# Patient Record
Sex: MALE | Race: WHITE | NOT HISPANIC OR LATINO
[De-identification: names, ages, dates, MRNs, and addresses within clinical notes are randomized per-mention and may not be internally consistent; named-entity substitution may affect disease eponyms.]

---

## 2017-06-20 ENCOUNTER — APPOINTMENT (OUTPATIENT)
Dept: UROLOGY | Facility: CLINIC | Age: 72
End: 2017-06-20

## 2017-06-20 DIAGNOSIS — R97.20 ELEVATED PROSTATE, SPECIFIC ANTIGEN [PSA]: ICD-10-CM

## 2018-07-24 ENCOUNTER — APPOINTMENT (OUTPATIENT)
Dept: UROLOGY | Facility: CLINIC | Age: 73
End: 2018-07-24
Payer: MEDICARE

## 2018-07-24 PROCEDURE — 99213 OFFICE O/P EST LOW 20 MIN: CPT | Mod: 25

## 2018-07-24 PROCEDURE — 51798 US URINE CAPACITY MEASURE: CPT

## 2018-07-25 LAB
BILIRUB UR QL STRIP: NORMAL
GLUCOSE UR-MCNC: NORMAL
HCG UR QL: 0.2 EU/DL
HGB UR QL STRIP.AUTO: NORMAL
KETONES UR-MCNC: NORMAL
LEUKOCYTE ESTERASE UR QL STRIP: NORMAL
NITRITE UR QL STRIP: NORMAL
PH UR STRIP: 6.5
PROT UR STRIP-MCNC: NORMAL
SP GR UR STRIP: 1.1

## 2019-03-22 ENCOUNTER — RX RENEWAL (OUTPATIENT)
Age: 74
End: 2019-03-22

## 2019-08-20 ENCOUNTER — APPOINTMENT (OUTPATIENT)
Dept: UROLOGY | Facility: CLINIC | Age: 74
End: 2019-08-20
Payer: MEDICARE

## 2019-08-20 VITALS
SYSTOLIC BLOOD PRESSURE: 131 MMHG | HEART RATE: 80 BPM | RESPIRATION RATE: 16 BRPM | HEIGHT: 69 IN | WEIGHT: 205 LBS | DIASTOLIC BLOOD PRESSURE: 78 MMHG | BODY MASS INDEX: 30.36 KG/M2

## 2019-08-20 PROCEDURE — 99213 OFFICE O/P EST LOW 20 MIN: CPT | Mod: 25

## 2019-08-20 PROCEDURE — 51798 US URINE CAPACITY MEASURE: CPT

## 2019-08-20 NOTE — PHYSICAL EXAM
[General Appearance - Well Developed] : well developed [General Appearance - Well Nourished] : well nourished [Abdomen Soft] : soft [Abdomen Tenderness] : non-tender [Abdomen Mass (___ Cm)] : no abdominal mass palpated [Urinary Bladder Findings] : the bladder was normal on palpation [Penis Abnormality] : normal circumcised penis [Rectal Exam - Rectum] : digital rectal exam was normal [No Prostate Nodules] : no prostate nodules [Prostate Size ___ gm] : prostate size [unfilled] gm [Edema] : no peripheral edema [Exaggerated Use Of Accessory Muscles For Inspiration] : no accessory muscle use [] : no respiratory distress [Oriented To Time, Place, And Person] : oriented to person, place, and time [Normal Station and Gait] : the gait and station were normal for the patient's age [No Focal Deficits] : no focal deficits

## 2019-08-20 NOTE — HISTORY OF PRESENT ILLNESS
[FreeTextEntry1] : Patient has history of LUTs secondary to BPH managed with Flomax. He had been on Proscar at one time but stopped due to side effects. He had been in retention when first seen 2014.  He is now on double dose Flomax, noting less nocturia (3-4). he has needed CIC for retention ~1x a month. A one time issues and no obvious risk factor that spurs it to occur. no dysuria or hematuria. \par  He also has an elevated PSA which is being followed. LAst PSA 8/15 just over 9: no Bx and wants to stop checking. \par \par PVR 32

## 2019-08-22 LAB — BACTERIA UR CULT: NORMAL

## 2019-10-25 ENCOUNTER — RX RENEWAL (OUTPATIENT)
Age: 74
End: 2019-10-25

## 2020-08-25 ENCOUNTER — TRANSCRIPTION ENCOUNTER (OUTPATIENT)
Age: 75
End: 2020-08-25

## 2020-08-25 ENCOUNTER — APPOINTMENT (OUTPATIENT)
Dept: UROLOGY | Facility: CLINIC | Age: 75
End: 2020-08-25
Payer: MEDICARE

## 2020-08-25 VITALS — TEMPERATURE: 98.2 F

## 2020-08-25 DIAGNOSIS — N52.01 ERECTILE DYSFUNCTION DUE TO ARTERIAL INSUFFICIENCY: ICD-10-CM

## 2020-08-25 PROCEDURE — 99213 OFFICE O/P EST LOW 20 MIN: CPT

## 2020-08-25 NOTE — PHYSICAL EXAM
[General Appearance - Well Developed] : well developed [General Appearance - Well Nourished] : well nourished [Abdomen Soft] : soft [Abdomen Hernia] : no hernia was discovered [Abdomen Mass (___ Cm)] : no abdominal mass palpated [Anus Abnormality] : the anus and perineum were normal [Rectal Exam - Rectum] : digital rectal exam was normal [No Prostate Nodules] : no prostate nodules [Prostate Size ___ gm] : prostate size [unfilled] gm [Edema] : no peripheral edema [Exaggerated Use Of Accessory Muscles For Inspiration] : no accessory muscle use [] : no respiratory distress [Normal Station and Gait] : the gait and station were normal for the patient's age [No Focal Deficits] : no focal deficits [Oriented To Time, Place, And Person] : oriented to person, place, and time [Inguinal Lymph Nodes Enlarged Bilaterally] : inguinal

## 2020-08-27 PROBLEM — N52.01 ERECTILE DYSFUNCTION DUE TO ARTERIAL INSUFFICIENCY: Status: ACTIVE | Noted: 2019-11-19

## 2020-08-27 NOTE — HISTORY OF PRESENT ILLNESS
[FreeTextEntry1] : Patient has history of LUTs secondary to BPH managed with Flomax. He had been on Proscar at one time but stopped due to side effects. He had been in retention when first seen 2014.  He is now on double dose Flomax, noting less nocturia (3-4). he has needed CIC for retention ~1x a month. A one time issues and no obvious risk factor that spurs it to occur. no dysuria or hematuria.\par In past year has CICed ~15 times; has episodes where needs to cath twice in a week then nothing for 2 months. always at night. no obvious reason. no UTI symptoms. \par sildenafil works. \par He also has an elevated PSA which is being followed. LAst PSA 8/15 just over 9: no Bx and wants to stop checking. \par \par

## 2020-08-30 LAB — BACTERIA UR CULT: NORMAL

## 2021-04-19 ENCOUNTER — RX RENEWAL (OUTPATIENT)
Age: 76
End: 2021-04-19

## 2021-07-23 ENCOUNTER — APPOINTMENT (OUTPATIENT)
Dept: UROLOGY | Facility: CLINIC | Age: 76
End: 2021-07-23
Payer: MEDICARE

## 2021-07-23 VITALS — HEART RATE: 81 BPM | SYSTOLIC BLOOD PRESSURE: 151 MMHG | DIASTOLIC BLOOD PRESSURE: 82 MMHG

## 2021-07-23 PROCEDURE — 51798 US URINE CAPACITY MEASURE: CPT

## 2021-07-23 PROCEDURE — 99213 OFFICE O/P EST LOW 20 MIN: CPT

## 2021-07-23 RX ORDER — SILDENAFIL 100 MG/1
100 TABLET, FILM COATED ORAL
Qty: 10 | Refills: 5 | Status: ACTIVE | COMMUNITY
Start: 2019-11-19 | End: 1900-01-01

## 2021-07-28 LAB — BACTERIA UR CULT: NORMAL

## 2021-07-31 NOTE — HISTORY OF PRESENT ILLNESS
[FreeTextEntry1] : Patient has history of LUTs secondary to BPH managed with Flomax. He had been on Proscar at one time but stopped due to side effects. He had been in retention when first seen 2014.  He is now on double dose Flomax, noting less nocturia (3-4). he has needed CIC for retention ~1x a month. A one time issues and no obvious risk factor that spurs it to occur. no dysuria or hematuria.\par In past year has CICed ~15 times; has episodes where needs to cath twice in a week then nothing for 2 months. always at night. no obvious reason. no UTI symptoms. \par sildenafil works. \par He also has an elevated PSA which is being followed. LAst PSA 8/15 just over 9: no Bx and wants to stop checking. \par \par 7/21 - stable - has to CIC twice a month, usually at night. No UTIs symptoms or hematuria. \par still uses sildenafil\par PVR 30

## 2021-07-31 NOTE — PHYSICAL EXAM
[General Appearance - Well Developed] : well developed [General Appearance - Well Nourished] : well nourished [Abdomen Soft] : soft [Abdomen Tenderness] : non-tender [Abdomen Hernia] : no hernia was discovered [Penis Abnormality] : normal circumcised penis [Urinary Bladder Findings] : the bladder was normal on palpation [Scrotum] : the scrotum was normal [Rectal Exam - Rectum] : digital rectal exam was normal [No Prostate Nodules] : no prostate nodules [Prostate Size ___ gm] : prostate size [unfilled] gm [Edema] : no peripheral edema [] : no respiratory distress [Exaggerated Use Of Accessory Muscles For Inspiration] : no accessory muscle use [Oriented To Time, Place, And Person] : oriented to person, place, and time [Normal Station and Gait] : the gait and station were normal for the patient's age [No Focal Deficits] : no focal deficits

## 2021-08-02 ENCOUNTER — RX RENEWAL (OUTPATIENT)
Age: 76
End: 2021-08-02

## 2022-07-21 ENCOUNTER — RX RENEWAL (OUTPATIENT)
Age: 77
End: 2022-07-21

## 2022-09-06 ENCOUNTER — APPOINTMENT (OUTPATIENT)
Dept: UROLOGY | Facility: CLINIC | Age: 77
End: 2022-09-06

## 2022-09-07 ENCOUNTER — APPOINTMENT (OUTPATIENT)
Dept: UROLOGY | Facility: CLINIC | Age: 77
End: 2022-09-07

## 2023-04-22 ENCOUNTER — RX RENEWAL (OUTPATIENT)
Age: 78
End: 2023-04-22

## 2023-06-09 ENCOUNTER — APPOINTMENT (OUTPATIENT)
Dept: UROLOGY | Facility: CLINIC | Age: 78
End: 2023-06-09
Payer: MEDICARE

## 2023-06-09 PROCEDURE — 51798 US URINE CAPACITY MEASURE: CPT

## 2023-06-09 PROCEDURE — 99212 OFFICE O/P EST SF 10 MIN: CPT

## 2023-06-09 NOTE — HISTORY OF PRESENT ILLNESS
[FreeTextEntry1] : Patient has history of LUTs secondary to BPH managed with Flomax. He had been on Proscar at one time but stopped due to side effects. He had been in retention when first seen 2014.  He is now on double dose Flomax, noting less nocturia (3-4). he has needed CIC for retention ~1x a month. A one time issues and no obvious risk factor that spurs it to occur. no dysuria or hematuria.\par In past year has CICed ~15 times; has episodes where needs to cath twice in a week then nothing for 2 months. always at night. no obvious reason. no UTI symptoms. \par sildenafil works. \par He also has an elevated PSA which is being followed. LAst PSA 8/15 just over 9: no Bx and wants to stop checking. \par \par 7/21 - stable - has to CIC twice a month, usually at night. No UTIs symptoms or hematuria. \par still uses sildenafil\par PVR 30\par \par 6/23 - nothing new. on double tamsulosin \par intermittent frequency - caths sporadically\par No hematuria UTI symptoms or UTIs \par PVR 74r

## 2023-06-12 LAB
APPEARANCE: CLEAR
BACTERIA UR CULT: NORMAL
BACTERIA: NEGATIVE /HPF
BILIRUBIN URINE: NEGATIVE
BLOOD URINE: NEGATIVE
CAST: 0 /LPF
COLOR: YELLOW
EPITHELIAL CELLS: 0 /HPF
GLUCOSE QUALITATIVE U: NEGATIVE MG/DL
KETONES URINE: NEGATIVE MG/DL
LEUKOCYTE ESTERASE URINE: NEGATIVE
MICROSCOPIC-UA: NORMAL
NITRITE URINE: NEGATIVE
PH URINE: 6.5
PROTEIN URINE: NEGATIVE MG/DL
RED BLOOD CELLS URINE: 0 /HPF
SPECIFIC GRAVITY URINE: 1.01
UROBILINOGEN URINE: 0.2 MG/DL
WHITE BLOOD CELLS URINE: 0 /HPF

## 2023-07-22 ENCOUNTER — RX RENEWAL (OUTPATIENT)
Age: 78
End: 2023-07-22

## 2024-01-05 ENCOUNTER — RX RENEWAL (OUTPATIENT)
Age: 79
End: 2024-01-05

## 2024-06-11 ENCOUNTER — APPOINTMENT (OUTPATIENT)
Dept: UROLOGY | Facility: CLINIC | Age: 79
End: 2024-06-11
Payer: MEDICARE

## 2024-06-11 DIAGNOSIS — N40.1 BENIGN PROSTATIC HYPERPLASIA WITH LOWER URINARY TRACT SYMPMS: ICD-10-CM

## 2024-06-11 PROCEDURE — 51798 US URINE CAPACITY MEASURE: CPT

## 2024-06-11 PROCEDURE — 99213 OFFICE O/P EST LOW 20 MIN: CPT

## 2024-06-11 PROCEDURE — G2211 COMPLEX E/M VISIT ADD ON: CPT

## 2024-06-11 RX ORDER — TAMSULOSIN HYDROCHLORIDE 0.4 MG/1
0.4 CAPSULE ORAL
Qty: 180 | Refills: 3 | Status: ACTIVE | COMMUNITY
Start: 2017-06-20 | End: 1900-01-01

## 2024-06-11 NOTE — HISTORY OF PRESENT ILLNESS
[FreeTextEntry1] : Patient has history of LUTs secondary to BPH managed with Flomax. He had been on Proscar at one time but stopped due to side effects. He had been in retention when first seen 2014.  He is now on double dose Flomax, noting less nocturia (3-4). he has needed CIC for retention ~1x a month. A one time issues and no obvious risk factor that spurs it to occur. no dysuria or hematuria. In past year has CICed ~15 times; has episodes where needs to cath twice in a week then nothing for 2 months. always at night. no obvious reason. no UTI symptoms.  sildenafil works.  He also has an elevated PSA which is being followed. LAst PSA 8/15 just over 9: no Bx and wants to stop checking.   7/21 - stable - has to CIC twice a month, usually at night. No UTIs symptoms or hematuria.  still uses sildenafil PVR 30  6/23 - nothing new. on double tamsulosin  intermittent frequency - caths sporadically No hematuria UTI symptoms or UTIs  PVR 74r   6/24  tamsulosin.8mg voiding as before - rarely caths  no UTIs or UTI symptoms.  PVR 28

## 2024-06-13 LAB — BACTERIA UR CULT: NORMAL
